# Patient Record
Sex: MALE | Race: WHITE | NOT HISPANIC OR LATINO | Employment: OTHER | URBAN - METROPOLITAN AREA
[De-identification: names, ages, dates, MRNs, and addresses within clinical notes are randomized per-mention and may not be internally consistent; named-entity substitution may affect disease eponyms.]

---

## 2019-03-29 ENCOUNTER — OFFICE VISIT (OUTPATIENT)
Dept: URGENT CARE | Facility: CLINIC | Age: 24
End: 2019-03-29
Payer: COMMERCIAL

## 2019-03-29 VITALS
WEIGHT: 160 LBS | TEMPERATURE: 98 F | RESPIRATION RATE: 16 BRPM | OXYGEN SATURATION: 98 % | BODY MASS INDEX: 21.67 KG/M2 | DIASTOLIC BLOOD PRESSURE: 74 MMHG | HEART RATE: 62 BPM | HEIGHT: 72 IN | SYSTOLIC BLOOD PRESSURE: 114 MMHG

## 2019-03-29 DIAGNOSIS — J30.89 ENVIRONMENTAL AND SEASONAL ALLERGIES: Primary | ICD-10-CM

## 2019-03-29 DIAGNOSIS — R06.02 SHORTNESS OF BREATH: ICD-10-CM

## 2019-03-29 DIAGNOSIS — R05.9 COUGH: ICD-10-CM

## 2019-03-29 DIAGNOSIS — J02.9 SORE THROAT: ICD-10-CM

## 2019-03-29 LAB
CTP QC/QA: YES
S PYO RRNA THROAT QL PROBE: NEGATIVE

## 2019-03-29 PROCEDURE — 3008F BODY MASS INDEX DOCD: CPT | Mod: CPTII,S$GLB,, | Performed by: PHYSICIAN ASSISTANT

## 2019-03-29 PROCEDURE — 87880 STREP A ASSAY W/OPTIC: CPT | Mod: QW,S$GLB,, | Performed by: PHYSICIAN ASSISTANT

## 2019-03-29 PROCEDURE — 99203 PR OFFICE/OUTPT VISIT, NEW, LEVL III, 30-44 MIN: ICD-10-PCS | Mod: S$GLB,,, | Performed by: PHYSICIAN ASSISTANT

## 2019-03-29 PROCEDURE — 99203 OFFICE O/P NEW LOW 30 MIN: CPT | Mod: S$GLB,,, | Performed by: PHYSICIAN ASSISTANT

## 2019-03-29 PROCEDURE — 87880 POCT RAPID STREP A: ICD-10-PCS | Mod: QW,S$GLB,, | Performed by: PHYSICIAN ASSISTANT

## 2019-03-29 PROCEDURE — 3008F PR BODY MASS INDEX (BMI) DOCUMENTED: ICD-10-PCS | Mod: CPTII,S$GLB,, | Performed by: PHYSICIAN ASSISTANT

## 2019-03-29 RX ORDER — ALBUTEROL SULFATE 90 UG/1
2 AEROSOL, METERED RESPIRATORY (INHALATION) EVERY 6 HOURS PRN
Qty: 1 INHALER | Refills: 1 | Status: SHIPPED | OUTPATIENT
Start: 2019-03-29

## 2019-03-29 RX ORDER — PREDNISONE 20 MG/1
20 TABLET ORAL DAILY
Qty: 5 TABLET | Refills: 0 | Status: SHIPPED | OUTPATIENT
Start: 2019-03-29 | End: 2019-04-03

## 2019-03-29 RX ORDER — BENZONATATE 100 MG/1
200 CAPSULE ORAL EVERY 6 HOURS PRN
Qty: 60 CAPSULE | Refills: 0 | Status: SHIPPED | OUTPATIENT
Start: 2019-03-29 | End: 2019-03-29 | Stop reason: SDUPTHER

## 2019-03-29 RX ORDER — BENZONATATE 100 MG/1
200 CAPSULE ORAL EVERY 6 HOURS PRN
Qty: 60 CAPSULE | Refills: 0 | Status: SHIPPED | OUTPATIENT
Start: 2019-03-29 | End: 2020-03-28

## 2019-03-29 RX ORDER — PREDNISONE 20 MG/1
20 TABLET ORAL DAILY
Qty: 5 TABLET | Refills: 0 | Status: SHIPPED | OUTPATIENT
Start: 2019-03-29 | End: 2019-03-29 | Stop reason: SDUPTHER

## 2019-03-29 RX ORDER — ALBUTEROL SULFATE 90 UG/1
2 AEROSOL, METERED RESPIRATORY (INHALATION) EVERY 6 HOURS PRN
Qty: 1 INHALER | Refills: 1 | Status: SHIPPED | OUTPATIENT
Start: 2019-03-29 | End: 2019-03-29 | Stop reason: SDUPTHER

## 2019-03-29 NOTE — PROGRESS NOTES
Subjective:       Patient ID: Raymon Springer is a 23 y.o. male.    Vitals:  height is 6' (1.829 m) and weight is 72.6 kg (160 lb). His oral temperature is 97.8 °F (36.6 °C). His blood pressure is 114/74 and his pulse is 62. His respiration is 16 and oxygen saturation is 98%.     Chief Complaint: Bronchitis (started on Dewayne 3/24/2019)    Patient states he began to have seasonal allergies sometime last week. Then he began to cough on Sunday and now he feels like his chest is tight and may have bronchitis. Takes claritin and uses Nasocort for allergies.    Cough   This is a new problem. The current episode started 1 to 4 weeks ago. The problem has been gradually worsening. The problem occurs every few minutes. The cough is productive of purulent sputum. Associated symptoms include nasal congestion, rhinorrhea, a sore throat and shortness of breath. Pertinent negatives include no chest pain, chills, fever, headaches, myalgias or rash. Associated symptoms comments: Patient states his chest feels tight and that he is somewhat short of breath. . The symptoms are aggravated by pollens. Treatments tried: OTC nasacort and vitamin C. The treatment provided mild relief. His past medical history is significant for environmental allergies.       Constitution: Negative for chills, fatigue and fever.   HENT: Positive for congestion, sinus pain, sinus pressure and sore throat.    Neck: Negative for painful lymph nodes.   Cardiovascular: Negative for chest pain and leg swelling.   Eyes: Negative for double vision and blurred vision.   Respiratory: Positive for cough and shortness of breath.    Gastrointestinal: Negative for nausea, vomiting and diarrhea.   Genitourinary: Negative for dysuria, frequency and urgency.   Musculoskeletal: Negative for joint pain, joint swelling, muscle cramps and muscle ache.   Skin: Negative for color change, pale and rash.   Allergic/Immunologic: Positive for environmental allergies. Negative for seasonal  allergies.   Neurological: Negative for dizziness, history of vertigo, light-headedness, passing out and headaches.   Hematologic/Lymphatic: Negative for swollen lymph nodes, easy bruising/bleeding and history of blood clots. Does not bruise/bleed easily.   Psychiatric/Behavioral: Negative for nervous/anxious, sleep disturbance and depression. The patient is not nervous/anxious.        Objective:      Physical Exam   Constitutional: He is oriented to person, place, and time. He appears well-developed and well-nourished. He is cooperative.  Non-toxic appearance. He does not appear ill. No distress.   HENT:   Head: Normocephalic and atraumatic.   Right Ear: Hearing, tympanic membrane, external ear and ear canal normal. Tympanic membrane is not injected and not erythematous. No middle ear effusion.   Left Ear: Hearing and external ear normal.   Ears:    Nose: Mucosal edema and rhinorrhea (clear) present. No nasal deformity. No epistaxis. Right sinus exhibits no maxillary sinus tenderness and no frontal sinus tenderness. Left sinus exhibits no maxillary sinus tenderness and no frontal sinus tenderness.   Mouth/Throat: Uvula is midline and mucous membranes are normal. No trismus in the jaw. Normal dentition. No uvula swelling. Posterior oropharyngeal edema and posterior oropharyngeal erythema present. No oropharyngeal exudate. Tonsils are 1+ on the right. Tonsils are 1+ on the left. No tonsillar exudate.       Eyes: Conjunctivae and lids are normal. No scleral icterus.   Sclera clear bilat   Neck: Trachea normal, full passive range of motion without pain and phonation normal. Neck supple.   Cardiovascular: Normal rate, regular rhythm, normal heart sounds, intact distal pulses and normal pulses.   Pulmonary/Chest: Effort normal and breath sounds normal. No accessory muscle usage. No respiratory distress. He has no wheezes. He has no rhonchi. He has no rales.   Dry cough during exam   Abdominal: Soft. Normal appearance and  bowel sounds are normal. He exhibits no distension. There is no tenderness.   Musculoskeletal: Normal range of motion. He exhibits no edema or deformity.   Neurological: He is alert and oriented to person, place, and time. He exhibits normal muscle tone. Coordination normal.   Skin: Skin is warm, dry and intact. He is not diaphoretic. No pallor.   Psychiatric: His speech is normal and behavior is normal. Judgment and thought content normal. His mood appears anxious. Cognition and memory are normal.   Nursing note and vitals reviewed.      Assessment:       1. Environmental and seasonal allergies    2. Sore throat    3. Cough    4. Shortness of breath        Plan:       Specifically asked for codeine syrup but this request was denied. Advised to try OTC delsyum syrup if needed.    Environmental and seasonal allergies  -     predniSONE (DELTASONE) 20 MG tablet; Take 1 tablet (20 mg total) by mouth once daily. for 5 days  Dispense: 5 tablet; Refill: 0    Sore throat  -     POCT rapid strep A  -     predniSONE (DELTASONE) 20 MG tablet; Take 1 tablet (20 mg total) by mouth once daily. for 5 days  Dispense: 5 tablet; Refill: 0    Cough  -     predniSONE (DELTASONE) 20 MG tablet; Take 1 tablet (20 mg total) by mouth once daily. for 5 days  Dispense: 5 tablet; Refill: 0  -     benzonatate (TESSALON PERLES) 100 MG capsule; Take 2 capsules (200 mg total) by mouth every 6 (six) hours as needed for Cough.  Dispense: 60 capsule; Refill: 0    Shortness of breath  -     albuterol (VENTOLIN HFA) 90 mcg/actuation inhaler; Inhale 2 puffs into the lungs every 6 (six) hours as needed for Wheezing. Rescue  Dispense: 1 Inhaler; Refill: 1      Patient Instructions   · Continue daily antihistamine and Nasocort  · Take steroid medication in the morning as it can cause insomnia if taken too late in the day  · Tessalon Perles for cough  · Use albuterol as needed for chest tightness and wheezing  · Humidifier can help with chest  congestion  · Refrain from smoking  · Below are suggestions for symptomatic relief:              -Tylenol every 4 hours OR ibuprofen every 6 hours as needed for pain/fever.              - Stay well hydrated and get plenty of rest to promote healing.              -Salt water gargles to soothe throat pain.              -Chloroseptic spray also helps to numb throat pain.              - Hot tea or hot water with lemon can soothe sore throat.              - Throat lozenges for relief during the day.              -Nasal saline spray reduces inflammation and dryness and helps clear nasal congestion.              -Warm face compresses to help with facial sinus pain/pressure.              -Vicks vapor rub at night.              -Flonase OTC or Nasacort OTC daily for nasal congestion. Aim to outside of nostril and slowly inhale through the nose while spraying. 1-2 sprays in each nostril in the morning.              -Simple foods like chicken noodle soup.              -Delsym helps with coughing at night              -Zyrtec/Claritin during the day & Benadryl at night may help with allergies.  · If you DO NOT have Hypertension or any history of palpitations, it is ok to take over the counter Sudafed or Mucinex D or Allegra-D or Claritin-D or Zyrtec-D.  · If you do take one of the above, it is ok to combine that with plain over the counter Mucinex or Allegra or Claritin or Zyrtec. If, for example, you are taking Zyrtec -D, you can combine that with Mucinex, but not Mucinex-D.  If you are taking Mucinex-D, you can combine that with plain Allegra or Claritin or Zyrtec.   · If you DO have Hypertension or palpitations, it is safe to take Coricidin HBP for relief of sinus symptoms.  · You must understand that you have received an Urgent Care treatment only and that you may be released before all of your medical problems are known or treated.   · You, the patient, will arrange for follow up care as instructed.   · If your condition  worsens or fails to improve we recommend that you receive another evaluation at the ER immediately or contact your PCP to discuss your concerns or return here.

## 2019-03-29 NOTE — PATIENT INSTRUCTIONS
· Continue daily antihistamine and Nasocort  · Take steroid medication in the morning as it can cause insomnia if taken too late in the day  · Tessalon Perles for cough  · Use albuterol as needed for chest tightness and wheezing  · Humidifier can help with chest congestion  · Refrain from smoking  · Below are suggestions for symptomatic relief:              -Tylenol every 4 hours OR ibuprofen every 6 hours as needed for pain/fever.              - Stay well hydrated and get plenty of rest to promote healing.              -Salt water gargles to soothe throat pain.              -Chloroseptic spray also helps to numb throat pain.              - Hot tea or hot water with lemon can soothe sore throat.              - Throat lozenges for relief during the day.              -Nasal saline spray reduces inflammation and dryness and helps clear nasal congestion.              -Warm face compresses to help with facial sinus pain/pressure.              -Vicks vapor rub at night.              -Flonase OTC or Nasacort OTC daily for nasal congestion. Aim to outside of nostril and slowly inhale through the nose while spraying. 1-2 sprays in each nostril in the morning.              -Simple foods like chicken noodle soup.              -Delsym helps with coughing at night              -Zyrtec/Claritin during the day & Benadryl at night may help with allergies.  · If you DO NOT have Hypertension or any history of palpitations, it is ok to take over the counter Sudafed or Mucinex D or Allegra-D or Claritin-D or Zyrtec-D.  · If you do take one of the above, it is ok to combine that with plain over the counter Mucinex or Allegra or Claritin or Zyrtec. If, for example, you are taking Zyrtec -D, you can combine that with Mucinex, but not Mucinex-D.  If you are taking Mucinex-D, you can combine that with plain Allegra or Claritin or Zyrtec.   · If you DO have Hypertension or palpitations, it is safe to take Coricidin HBP for relief of sinus  symptoms.  · You must understand that you have received an Urgent Care treatment only and that you may be released before all of your medical problems are known or treated.   · You, the patient, will arrange for follow up care as instructed.   · If your condition worsens or fails to improve we recommend that you receive another evaluation at the ER immediately or contact your PCP to discuss your concerns or return here.

## 2019-07-08 ENCOUNTER — TELEPHONE (OUTPATIENT)
Dept: FAMILY MEDICINE CLINIC | Facility: CLINIC | Age: 24
End: 2019-07-08

## 2019-07-08 DIAGNOSIS — Z00.00 ANNUAL PHYSICAL EXAM: Primary | ICD-10-CM

## 2019-07-08 NOTE — TELEPHONE ENCOUNTER
Mom called and scheduled patient for physical on 7/31 and fasting labs on 7/24. What would you like to order?

## 2019-07-24 ENCOUNTER — LAB (OUTPATIENT)
Dept: FAMILY MEDICINE CLINIC | Facility: CLINIC | Age: 24
End: 2019-07-24

## 2019-07-24 DIAGNOSIS — Z00.00 ANNUAL PHYSICAL EXAM: ICD-10-CM

## 2019-07-24 LAB
ALBUMIN SERPL-MCNC: 4.6 G/DL (ref 3.5–4.8)
ALBUMIN/GLOB SERPL: 1.8 G/DL (ref 1–1.7)
ALP SERPL-CCNC: 56 U/L (ref 32–91)
ALT SERPL W P-5'-P-CCNC: 17 U/L (ref 17–63)
ANION GAP SERPL CALCULATED.3IONS-SCNC: 13.9 MMOL/L (ref 5–15)
ARTICHOKE IGE QN: 89 MG/DL (ref 0–100)
AST SERPL-CCNC: 19 U/L (ref 15–41)
BILIRUB SERPL-MCNC: 0.9 MG/DL (ref 0.3–1.2)
BUN BLD-MCNC: 12 MG/DL (ref 8–20)
BUN/CREAT SERPL: 15 (ref 6.2–20.3)
CALCIUM SPEC-SCNC: 9.8 MG/DL (ref 8.9–10.3)
CHLORIDE SERPL-SCNC: 103 MMOL/L (ref 101–111)
CHOLEST SERPL-MCNC: 143 MG/DL
CO2 SERPL-SCNC: 26 MMOL/L (ref 22–32)
CREAT BLD-MCNC: 0.8 MG/DL (ref 0.7–1.2)
GFR SERPL CREATININE-BSD FRML MDRD: 120 ML/MIN/1.73
GLOBULIN UR ELPH-MCNC: 2.6 GM/DL (ref 2.5–3.8)
GLUCOSE BLD-MCNC: 88 MG/DL (ref 65–99)
HDLC SERPL QL: 3.04
HDLC SERPL-MCNC: 47 MG/DL
LDLC/HDLC SERPL: 1.82 {RATIO}
POTASSIUM BLD-SCNC: 3.9 MMOL/L (ref 3.6–5.1)
PROT SERPL-MCNC: 7.2 G/DL (ref 6.1–7.9)
SODIUM BLD-SCNC: 139 MMOL/L (ref 136–144)
TRIGL SERPL-MCNC: 53 MG/DL
VLDLC SERPL-MCNC: 10.6 MG/DL

## 2019-07-24 PROCEDURE — 36415 COLL VENOUS BLD VENIPUNCTURE: CPT | Performed by: FAMILY MEDICINE

## 2019-07-24 PROCEDURE — 80053 COMPREHEN METABOLIC PANEL: CPT | Performed by: FAMILY MEDICINE

## 2019-07-24 PROCEDURE — 80061 LIPID PANEL: CPT | Performed by: FAMILY MEDICINE

## 2019-07-31 ENCOUNTER — OFFICE VISIT (OUTPATIENT)
Dept: FAMILY MEDICINE CLINIC | Facility: CLINIC | Age: 24
End: 2019-07-31

## 2019-07-31 VITALS
SYSTOLIC BLOOD PRESSURE: 122 MMHG | WEIGHT: 149.2 LBS | RESPIRATION RATE: 12 BRPM | HEART RATE: 111 BPM | DIASTOLIC BLOOD PRESSURE: 83 MMHG

## 2019-07-31 DIAGNOSIS — M25.561 CHRONIC PAIN OF RIGHT KNEE: ICD-10-CM

## 2019-07-31 DIAGNOSIS — G89.29 CHRONIC PAIN OF RIGHT KNEE: ICD-10-CM

## 2019-07-31 DIAGNOSIS — F20.1 DISORGANIZED SCHIZOPHRENIA (HCC): ICD-10-CM

## 2019-07-31 DIAGNOSIS — Z00.00 PHYSICAL EXAM: Primary | ICD-10-CM

## 2019-07-31 PROBLEM — F20.9 SCHIZOPHRENIA (HCC): Status: ACTIVE | Noted: 2019-07-31

## 2019-07-31 PROCEDURE — 99395 PREV VISIT EST AGE 18-39: CPT | Performed by: FAMILY MEDICINE

## 2019-07-31 RX ORDER — MELOXICAM 15 MG/1
15 TABLET ORAL DAILY
Qty: 30 TABLET | Refills: 5 | Status: SHIPPED | OUTPATIENT
Start: 2019-07-31

## 2019-07-31 RX ORDER — DEXTROAMPHETAMINE SACCHARATE, AMPHETAMINE ASPARTATE MONOHYDRATE, DEXTROAMPHETAMINE SULFATE AND AMPHETAMINE SULFATE 2.5; 2.5; 2.5; 2.5 MG/1; MG/1; MG/1; MG/1
CAPSULE, EXTENDED RELEASE ORAL
COMMUNITY
Start: 2014-01-03 | End: 2019-07-31

## 2019-07-31 NOTE — PROGRESS NOTES
Rooming Tab(CC,VS,Pt Hx,Fall Screen)  Chief Complaint   Patient presents with   • Annual Exam       Subjective 23-year-old male here for a physical.  This patient has had significant psychiatric illness which did not obtain that history from him until later in the interview.  He apparently has had multiple inpatient hospital stays, apparently prolonged and against his will.  He apparently was diagnosed with schizophrenia and I do not have all the details of his hospital was a [facility] but he has been in several out of state hospitals.  He apparently came home 6 weeks ago and is staying with his parents now.  He denies any suicidal or homicidal ideation.  He feels like he has a power to see somewhat into the future.  He is very reluctant to talk about much of his issues with his psychiatric condition, he has refused to see a psychiatrist in the past here and I am trying to get him to agree to see 1.  He request to be put on Zoloft which I do not think will give him any assistance because he has no complaints of depression or anxiety, he seems to be hyper focused on his right knee and how his right knee needs to be fixed surgically as it is an issue with an internal derangement.  He complains that his right knee has been hurting him now for 6 months and he has already seen Dr. Mathis who did surgery on his knee several years ago for a meniscal tear.  Apparently the patient had an MRI which was negative and the orthopedic doctor told him there was nothing he needed to do with his knee except start using it more.  He states that his knee hurts all the time but is worse after he is up and about.  He does not have a lot of swelling with it.  It does not give out on him.  He just describes it is hurting.  There is concern from his parents that he has a psychosomatic component to his illness or especially to his knee.    I have reviewed and updated his medications, medical history and problem list during today's office visit.      Patient Care Team:  Brian Acosta MD as PCP - General (Family Medicine)    Problem List Tab  Medications Tab  Synopsis Tab  Chart Review Tab  Care Everywhere Tab  Immunizations Tab  Patient History Tab    Social History     Tobacco Use   • Smoking status: Never Smoker   • Smokeless tobacco: Never Used   Substance Use Topics   • Alcohol use: No     Frequency: Never       Review of Systems   Constitutional: Negative for chills, fatigue and fever.   HENT: Negative for nosebleeds.    Eyes: Negative for double vision.   Respiratory: Negative for chest tightness and shortness of breath.    Cardiovascular: Negative for chest pain and palpitations.   Gastrointestinal: Negative for abdominal pain and blood in stool.   Genitourinary: Negative for flank pain and hematuria.   Musculoskeletal: Positive for gait problem.        Right knee pain   Neurological: Negative for dizziness, seizures and syncope.   Psychiatric/Behavioral: Negative for depressed mood. The patient is not nervous/anxious.        Objective     Rooming Tab(CC,VS,Pt Hx,Fall Screen)  /83 (BP Location: Right arm, Patient Position: Sitting, Cuff Size: Large Adult)   Pulse 111   Resp 12   Wt 67.7 kg (149 lb 3.2 oz)     There is no height or weight on file to calculate BMI.    Physical Exam   Constitutional: He is oriented to person, place, and time. He appears well-developed and well-nourished. No distress.   HENT:   Head: Normocephalic and atraumatic.   Nose: Nose normal.   Mouth/Throat: Oropharynx is clear and moist.   Eyes: Conjunctivae, EOM and lids are normal. Pupils are equal, round, and reactive to light.   Neck: Trachea normal and normal range of motion. Neck supple. Carotid bruit is not present. No thyroid mass and no thyromegaly present.   Cardiovascular: Normal rate, regular rhythm, normal heart sounds and intact distal pulses.   Pulmonary/Chest: Effort normal and breath sounds normal. He has no wheezes.   Abdominal: Soft. Bowel sounds  are normal. He exhibits no mass. There is no tenderness.   Musculoskeletal: Normal range of motion. He exhibits no edema.   The patient's range of motion of his knee is normal  He has some mild swelling below the patella, he is nontender along the joint line.  Negative Lockman Blanca negative anterior posterior drawer sign   Neurological: He is alert and oriented to person, place, and time.   Skin: Skin is warm and dry.   Psychiatric: His speech is normal.   The patient has very strange behavior.  He is paranoid.  He refuses to let me talk to his mother or father.  He denies any active hallucinations.  He denies depression or anxiety.  I think he has a psychosomatic disorder on his knee and I told him that.  Zoloft is not going to help him he needs a psychiatrist He is attentive.   Nursing note and vitals reviewed.       Statin Choice Calculator  Data Reviewed:               Lab Results   Component Value Date    BUN 12 07/24/2019    CREATININE 0.80 07/24/2019    EGFRIFNONA 120 07/24/2019     07/24/2019    K 3.9 07/24/2019     07/24/2019    CALCIUM 9.8 07/24/2019    ALBUMIN 4.60 07/24/2019    BILITOT 0.9 07/24/2019    ALKPHOS 56 07/24/2019    AST 19 07/24/2019    ALT 17 07/24/2019    TRIG 53 07/24/2019    HDL 47 07/24/2019    VLDL 10.6 07/24/2019    LDL 89 07/24/2019    LDLHDL 1.82 07/24/2019      Assessment/Plan   Order Review Tab  Health Maintenance Tab  Patient Plan/Order Tab  Diagnoses and all orders for this visit:    1. Physical exam (Primary)  Assessment & Plan:  EXERCISE  DIET  WEIGHT LOSS  This patient needs to see a psychiatrist.  I will try to get him in with Dr. Anthony, if we cannot get him in within a reasonable amount of time, we may try someone in [place].  He refuses to let me talk to his parents, he is of no harm to himself or anyone else however he certainly needs help and Zoloft is not going to help him.  He is delusional, denies hallucinations.  I think his knee pain is all  psychosomatic but he wants another opinion so we will have him see a Dr. Jones.  I need to review his MRI that was done.  I told the patient he needs to get up and moving around in his knee in my opinion will be fine      2. Chronic pain of right knee  -     Ambulatory Referral to Orthopedic Surgery    3. Disorganized schizophrenia (CMS/HCC)  -     Ambulatory Referral to Psychiatry    Other orders  -     meloxicam (MOBIC) 15 MG tablet; Take 1 tablet by mouth Daily.  Dispense: 30 tablet; Refill: 5      Wrapup Tab  Return if symptoms worsen or fail to improve.

## 2019-07-31 NOTE — ASSESSMENT & PLAN NOTE
EXERCISE  DIET  WEIGHT LOSS  This patient needs to see a psychiatrist.  I will try to get him in with Dr. Anthony, if we cannot get him in within a reasonable amount of time, we may try someone in [place].  He refuses to let me talk to his parents, he is of no harm to himself or anyone else however he certainly needs help and Zoloft is not going to help him.  He is delusional, denies hallucinations.  I think his knee pain is all psychosomatic but he wants another opinion so we will have him see a Dr. Jones.  I need to review his MRI that was done.  I told the patient he needs to get up and moving around in his knee in my opinion will be fine

## 2019-08-14 ENCOUNTER — TELEPHONE (OUTPATIENT)
Dept: FAMILY MEDICINE CLINIC | Facility: CLINIC | Age: 24
End: 2019-08-14

## 2019-08-14 NOTE — TELEPHONE ENCOUNTER
Mom left a voice message that patient has been diagnosed with mental health issues.  He has moved back here and has no doctor.  (It looks like he just saw you last month.)  Mom says all patient does is lay in bed 24 hours a day.  When he was on Zoloft it took the pain away, real and not real.  He doesn't see the [facility] until 8/27.  Mom wants to speak with you to see if you will prescribe Zoloft until he is seen at the [facility].  Please call Mom to discuss.

## 2019-10-17 ENCOUNTER — TRANSCRIBE ORDERS (OUTPATIENT)
Dept: ADMINISTRATIVE | Facility: HOSPITAL | Age: 24
End: 2019-10-17

## 2019-10-17 DIAGNOSIS — M79.604 PAIN OF RIGHT LEG: Primary | ICD-10-CM

## 2019-11-15 ENCOUNTER — APPOINTMENT (OUTPATIENT)
Dept: INFUSION THERAPY | Facility: HOSPITAL | Age: 24
End: 2019-11-15

## 2019-11-27 ENCOUNTER — TELEPHONE (OUTPATIENT)
Dept: ORTHOPEDIC SURGERY | Facility: CLINIC | Age: 24
End: 2019-11-27

## 2019-12-11 ENCOUNTER — TELEPHONE (OUTPATIENT)
Dept: FAMILY MEDICINE CLINIC | Facility: CLINIC | Age: 24
End: 2019-12-11

## 2019-12-11 RX ORDER — OLANZAPINE 10 MG/1
10 TABLET ORAL NIGHTLY
Qty: 30 TABLET | Refills: 3 | Status: SHIPPED | OUTPATIENT
Start: 2019-12-11

## 2019-12-11 NOTE — TELEPHONE ENCOUNTER
Patient states he needs an rx sent in today for Zyprexa 10mg once daily.  Said he is going out of town tomorrow and needs this today.

## 2019-12-12 ENCOUNTER — TELEPHONE (OUTPATIENT)
Dept: FAMILY MEDICINE CLINIC | Facility: CLINIC | Age: 24
End: 2019-12-12

## 2019-12-12 NOTE — TELEPHONE ENCOUNTER
Pt called left a message about a script and I tried to call him back and let him know the script has been filled. The mailbox is full.